# Patient Record
Sex: MALE | Race: WHITE
[De-identification: names, ages, dates, MRNs, and addresses within clinical notes are randomized per-mention and may not be internally consistent; named-entity substitution may affect disease eponyms.]

---

## 2017-01-17 ENCOUNTER — RX ONLY (RX ONLY)
Age: 47
End: 2017-01-17

## 2017-01-17 ENCOUNTER — DOCTOR'S OFFICE (OUTPATIENT)
Dept: URBAN - NONMETROPOLITAN AREA CLINIC 1 | Facility: CLINIC | Age: 47
Setting detail: OPHTHALMOLOGY
End: 2017-01-17
Payer: COMMERCIAL

## 2017-01-17 DIAGNOSIS — H52.4: ICD-10-CM

## 2017-01-17 DIAGNOSIS — Z01.00: ICD-10-CM

## 2017-01-17 DIAGNOSIS — H52.13: ICD-10-CM

## 2017-01-17 PROCEDURE — 92014 COMPRE OPH EXAM EST PT 1/>: CPT | Performed by: OPTOMETRIST

## 2017-01-17 PROCEDURE — 92310 CONTACT LENS FITTING OU: CPT | Performed by: OPTOMETRIST

## 2017-01-17 ASSESSMENT — REFRACTION_OUTSIDERX
OD_AXIS: 165
OS_CYLINDER: -0.25
OD_VA2: 20/25+2
OS_ADD: +1.75
OD_VA3: 20/
OD_ADD: +1.75
OD_SPHERE: -9.75
OS_VA1: 20/25
OU_VA: 20/
OS_AXIS: 090
OS_VA3: 20/
OD_CYLINDER: -0.25
OS_SPHERE: -10.50
OS_VA2: 20/25
OD_VA1: 20/25+2

## 2017-01-17 ASSESSMENT — SPHEQUIV_DERIVED: OD_SPHEQUIV: -10.625

## 2017-01-17 ASSESSMENT — REFRACTION_MANIFEST
OS_VA3: 20/
OU_VA: 20/
OU_VA: 20/
OD_VA1: 20/
OD_VA2: 20/
OD_VA2: 20/
OS_VA1: 20/
OS_VA3: 20/
OD_VA3: 20/
OD_VA1: 20/
OS_VA1: 20/
OD_VA3: 20/
OS_VA2: 20/
OS_VA2: 20/

## 2017-01-17 ASSESSMENT — REFRACTION_AUTOREFRACTION
OS_CYLINDER: SPH
OD_AXIS: 141
OS_SPHERE: -11.50
OD_SPHERE: -10.50
OD_CYLINDER: -0.25

## 2017-01-17 ASSESSMENT — REFRACTION_CURRENTRX
OS_OVR_VA: 20/
OS_OVR_VA: 20/
OD_OVR_VA: 20/
OD_OVR_VA: 20/
OS_OVR_VA: 20/
OD_OVR_VA: 20/

## 2017-01-17 ASSESSMENT — KERATOMETRY
OD_AXISANGLE_DEGREES: 097
OS_AXISANGLE_DEGREES: 112
OD_K2POWER_DIOPTERS: 44.75
OS_K2POWER_DIOPTERS: 45.00
OD_K1POWER_DIOPTERS: 44.00
OS_K1POWER_DIOPTERS: 44.75

## 2017-01-17 ASSESSMENT — VISUAL ACUITY
OS_BCVA: 20/25+2
OD_BCVA: 20/25+1

## 2017-01-17 ASSESSMENT — AXIALLENGTH_DERIVED: OD_AL: 28.1473

## 2017-01-17 ASSESSMENT — CONFRONTATIONAL VISUAL FIELD TEST (CVF)
OS_FINDINGS: FULL
OD_FINDINGS: FULL

## 2018-08-21 ENCOUNTER — DOCTOR'S OFFICE (OUTPATIENT)
Dept: URBAN - NONMETROPOLITAN AREA CLINIC 1 | Facility: CLINIC | Age: 48
Setting detail: OPHTHALMOLOGY
End: 2018-08-21
Payer: COMMERCIAL

## 2018-08-21 DIAGNOSIS — H52.13: ICD-10-CM

## 2018-08-21 DIAGNOSIS — H52.4: ICD-10-CM

## 2018-08-21 PROCEDURE — 92310 CONTACT LENS FITTING OU: CPT | Performed by: OPTOMETRIST

## 2018-08-21 PROCEDURE — 92014 COMPRE OPH EXAM EST PT 1/>: CPT | Performed by: OPTOMETRIST

## 2018-08-21 ASSESSMENT — REFRACTION_OUTSIDERX
OU_VA: 20/
OS_VA1: 20/25
OS_ADD: +1.75
OS_SPHERE: -10.50
OD_SPHERE: -9.75
OS_VA2: 20/25
OD_VA1: 20/25+2
OS_CYLINDER: -0.25
OS_AXIS: 090
OD_AXIS: 165
OD_ADD: +1.75
OD_VA3: 20/
OD_CYLINDER: -0.25
OS_VA3: 20/
OD_VA2: 20/25+2

## 2018-08-21 ASSESSMENT — REFRACTION_CURRENTRX
OS_ADD: +1.75
OS_SPHERE: -10.50
OD_VPRISM_DIRECTION: BF
OD_SPHERE: -9.75
OD_ADD: +1.75
OS_OVR_VA: 20/
OS_VPRISM_DIRECTION: BF
OS_CYLINDER: -0.25
OD_OVR_VA: 20/
OD_AXIS: 165
OS_OVR_VA: 20/
OS_AXIS: 090
OD_OVR_VA: 20/
OS_OVR_VA: 20/
OD_CYLINDER: -0.25
OD_OVR_VA: 20/

## 2018-08-21 ASSESSMENT — REFRACTION_MANIFEST
OD_VA1: 20/
OD_VA2: 20/
OU_VA: 20/
OD_VA1: 20/
OU_VA: 20/
OD_VA3: 20/
OS_VA2: 20/
OS_VA1: 20/
OS_VA2: 20/
OD_VA2: 20/
OS_VA3: 20/
OS_VA1: 20/
OD_VA3: 20/
OS_VA3: 20/

## 2018-08-21 ASSESSMENT — CONFRONTATIONAL VISUAL FIELD TEST (CVF)
OD_FINDINGS: FULL
OS_FINDINGS: FULL

## 2018-08-21 ASSESSMENT — VISUAL ACUITY
OS_BCVA: 20/20
OD_BCVA: 20/25-2

## 2018-08-21 ASSESSMENT — REFRACTION_AUTOREFRACTION
OS_CYLINDER: SPH
OD_AXIS: 141
OD_SPHERE: -10.50
OS_SPHERE: -11.50
OD_CYLINDER: -0.25

## 2018-08-21 ASSESSMENT — SPHEQUIV_DERIVED: OD_SPHEQUIV: -10.625

## 2019-11-26 ENCOUNTER — DOCTOR'S OFFICE (OUTPATIENT)
Dept: URBAN - NONMETROPOLITAN AREA CLINIC 1 | Facility: CLINIC | Age: 49
Setting detail: OPHTHALMOLOGY
End: 2019-11-26
Payer: COMMERCIAL

## 2019-11-26 DIAGNOSIS — H52.13: ICD-10-CM

## 2019-11-26 DIAGNOSIS — H52.4: ICD-10-CM

## 2019-11-26 PROCEDURE — 92014 COMPRE OPH EXAM EST PT 1/>: CPT | Performed by: OPTOMETRIST

## 2019-11-26 PROCEDURE — 92310 CONTACT LENS FITTING OU: CPT | Performed by: OPTOMETRIST

## 2019-11-26 ASSESSMENT — REFRACTION_MANIFEST
OS_VA1: 20/25
OD_VA1: 20/
OD_AXIS: 120
OD_VA2: 20/25+2
OD_VA3: 20/
OS_ADD: +2.00
OD_CYLINDER: -0.25
OD_VA3: 20/
OS_VA1: 20/
OS_CYLINDER: -0.25
OS_VA3: 20/
OS_VA2: 20/
OD_VA1: 20/25+2
OS_SPHERE: -10.50
OS_VA3: 20/
OU_VA: 20/
OD_ADD: +2.00
OD_SPHERE: -9.50
OS_AXIS: 090
OD_VA2: 20/
OS_VA2: 20/25
OU_VA: 20/

## 2019-11-26 ASSESSMENT — KERATOMETRY
OD_K2POWER_DIOPTERS: 44.75
OD_K1POWER_DIOPTERS: 44.00
OD_AXISANGLE_DEGREES: 097
OS_AXISANGLE_DEGREES: 112
OS_K2POWER_DIOPTERS: 45.00
OS_K1POWER_DIOPTERS: 44.75

## 2019-11-26 ASSESSMENT — REFRACTION_AUTOREFRACTION
OD_AXIS: 136
OS_CYLINDER: -0.50
OS_AXIS: 084
OD_CYLINDER: -1.00
OS_SPHERE: -11.50
OD_SPHERE: -9.50

## 2019-11-26 ASSESSMENT — VISUAL ACUITY
OD_BCVA: 20/20-1
OS_BCVA: 20/25+2

## 2019-11-26 ASSESSMENT — REFRACTION_CURRENTRX
OD_SPHERE: -9.75
OS_OVR_VA: 20/
OD_ADD: +1.75
OD_AXIS: 165
OS_OVR_VA: 20/
OS_CYLINDER: -0.25
OS_OVR_VA: 20/
OS_SPHERE: -10.50
OS_VPRISM_DIRECTION: BF
OS_ADD: +1.75
OD_VPRISM_DIRECTION: BF
OD_CYLINDER: -0.25
OS_AXIS: 090
OD_OVR_VA: 20/

## 2019-11-26 ASSESSMENT — SPHEQUIV_DERIVED
OD_SPHEQUIV: -10
OS_SPHEQUIV: -10.625
OS_SPHEQUIV: -11.75
OD_SPHEQUIV: -9.625

## 2019-11-26 ASSESSMENT — AXIALLENGTH_DERIVED
OD_AL: 27.6034
OS_AL: 27.8883
OS_AL: 28.5145
OD_AL: 27.8049

## 2019-11-26 ASSESSMENT — CONFRONTATIONAL VISUAL FIELD TEST (CVF)
OD_FINDINGS: FULL
OS_FINDINGS: FULL

## 2020-11-30 ENCOUNTER — DOCTOR'S OFFICE (OUTPATIENT)
Dept: URBAN - NONMETROPOLITAN AREA CLINIC 1 | Facility: CLINIC | Age: 50
Setting detail: OPHTHALMOLOGY
End: 2020-11-30
Payer: COMMERCIAL

## 2020-11-30 DIAGNOSIS — H52.13: ICD-10-CM

## 2020-11-30 DIAGNOSIS — H52.4: ICD-10-CM

## 2020-11-30 DIAGNOSIS — H25.013: ICD-10-CM

## 2020-11-30 PROBLEM — Z01.00 GOOD OCULAR HEALTH OU ; BOTH EYES: Status: RESOLVED | Noted: 2017-01-17 | Resolved: 2020-11-30

## 2020-11-30 PROCEDURE — 92014 COMPRE OPH EXAM EST PT 1/>: CPT | Performed by: OPTOMETRIST

## 2020-11-30 PROCEDURE — 92310 CONTACT LENS FITTING OU: CPT | Performed by: OPTOMETRIST

## 2020-11-30 ASSESSMENT — SPHEQUIV_DERIVED
OS_SPHEQUIV: -11.75
OD_SPHEQUIV: -10
OS_SPHEQUIV: -10.625
OD_SPHEQUIV: -9.625

## 2020-11-30 ASSESSMENT — REFRACTION_CURRENTRX
OS_ADD: +1.75
OS_CYLINDER: -0.25
OD_ADD: +1.75
OS_OVR_VA: 20/
OS_AXIS: 090
OS_VPRISM_DIRECTION: BF
OD_CYLINDER: -0.25
OS_SPHERE: -10.50
OD_SPHERE: -9.75
OD_AXIS: 165
OD_OVR_VA: 20/
OD_VPRISM_DIRECTION: BF

## 2020-11-30 ASSESSMENT — REFRACTION_MANIFEST
OD_AXIS: 120
OD_CYLINDER: -0.25
OS_VA1: 20/25
OS_CYLINDER: -0.25
OD_ADD: +2.00
OS_ADD: +2.00
OD_SPHERE: -9.50
OS_VA2: 20/25
OS_SPHERE: -10.50
OD_VA1: 20/25+2
OS_AXIS: 090
OD_VA2: 20/25+2

## 2020-11-30 ASSESSMENT — AXIALLENGTH_DERIVED
OS_AL: 27.8883
OD_AL: 27.8049
OS_AL: 28.5145
OD_AL: 27.6034

## 2020-11-30 ASSESSMENT — KERATOMETRY
OD_AXISANGLE_DEGREES: 097
OS_K2POWER_DIOPTERS: 45.00
OD_K1POWER_DIOPTERS: 44.00
OD_K2POWER_DIOPTERS: 44.75
OS_K1POWER_DIOPTERS: 44.75
OS_AXISANGLE_DEGREES: 112

## 2020-11-30 ASSESSMENT — REFRACTION_AUTOREFRACTION
OS_AXIS: 084
OD_CYLINDER: -1.00
OD_AXIS: 136
OS_CYLINDER: -0.50
OD_SPHERE: -9.50
OS_SPHERE: -11.50

## 2020-11-30 ASSESSMENT — TONOMETRY
OD_IOP_MMHG: 18
OS_IOP_MMHG: 18

## 2020-11-30 ASSESSMENT — VISUAL ACUITY
OS_BCVA: 20/20-1
OD_BCVA: 20/25+1

## 2020-11-30 ASSESSMENT — CONFRONTATIONAL VISUAL FIELD TEST (CVF)
OS_FINDINGS: FULL
OD_FINDINGS: FULL

## 2021-12-03 ENCOUNTER — DOCTOR'S OFFICE (OUTPATIENT)
Dept: URBAN - NONMETROPOLITAN AREA CLINIC 1 | Facility: CLINIC | Age: 51
Setting detail: OPHTHALMOLOGY
End: 2021-12-03
Payer: COMMERCIAL

## 2021-12-03 DIAGNOSIS — H52.13: ICD-10-CM

## 2021-12-03 DIAGNOSIS — H52.4: ICD-10-CM

## 2021-12-03 PROBLEM — H25.013 CORTICAL CATARACT; BOTH EYES: Status: ACTIVE | Noted: 2020-11-30

## 2021-12-03 PROCEDURE — 92310 CONTACT LENS FITTING OU: CPT | Performed by: OPTOMETRIST

## 2021-12-03 PROCEDURE — 92014 COMPRE OPH EXAM EST PT 1/>: CPT | Performed by: OPTOMETRIST

## 2021-12-03 ASSESSMENT — VISUAL ACUITY
OD_BCVA: 20/20
OS_BCVA: 20/20-1

## 2021-12-03 ASSESSMENT — REFRACTION_MANIFEST
OS_ADD: +2.25
OD_VA2: 20/25+2
OS_VA1: 20/25
OD_ADD: +2.25
OD_AXIS: 120
OS_AXIS: 090
OS_CYLINDER: -0.25
OD_CYLINDER: -0.25
OS_VA2: 20/25
OD_SPHERE: -9.50
OS_SPHERE: -10.50
OD_VA1: 20/25+2

## 2021-12-03 ASSESSMENT — SPHEQUIV_DERIVED
OD_SPHEQUIV: -9.625
OS_SPHEQUIV: -10.625
OD_SPHEQUIV: -10.25
OS_SPHEQUIV: -11.125

## 2021-12-03 ASSESSMENT — REFRACTION_CURRENTRX
OD_CYLINDER: -0.25
OS_OVR_VA: 20/
OD_AXIS: 120
OS_ADD: +1.75
OD_ADD: +1.75
OS_SPHERE: -10.50
OD_SPHERE: -9.50
OS_VPRISM_DIRECTION: BF
OD_OVR_VA: 20/
OS_AXIS: 090
OS_CYLINDER: -0.25
OD_VPRISM_DIRECTION: BF

## 2021-12-03 ASSESSMENT — REFRACTION_AUTOREFRACTION
OS_CYLINDER: -0.25
OD_AXIS: 116
OD_SPHERE: -10.00
OD_CYLINDER: -0.50
OS_SPHERE: -11.00
OS_AXIS: 157

## 2021-12-03 ASSESSMENT — KERATOMETRY
OD_K1POWER_DIOPTERS: 44.00
OS_AXISANGLE_DEGREES: 112
OD_AXISANGLE_DEGREES: 097
OS_K2POWER_DIOPTERS: 45.00
OS_K1POWER_DIOPTERS: 44.75
OD_K2POWER_DIOPTERS: 44.75

## 2021-12-03 ASSESSMENT — TONOMETRY
OD_IOP_MMHG: 18
OS_IOP_MMHG: 18

## 2021-12-03 ASSESSMENT — CONFRONTATIONAL VISUAL FIELD TEST (CVF)
OS_FINDINGS: FULL
OD_FINDINGS: FULL

## 2021-12-03 ASSESSMENT — AXIALLENGTH_DERIVED
OD_AL: 27.9409
OS_AL: 28.1632
OS_AL: 27.8883
OD_AL: 27.6034

## 2022-12-05 ENCOUNTER — DOCTOR'S OFFICE (OUTPATIENT)
Dept: URBAN - NONMETROPOLITAN AREA CLINIC 1 | Facility: CLINIC | Age: 52
Setting detail: OPHTHALMOLOGY
End: 2022-12-05
Payer: COMMERCIAL

## 2022-12-05 DIAGNOSIS — H52.4: ICD-10-CM

## 2022-12-05 DIAGNOSIS — H52.13: ICD-10-CM

## 2022-12-05 PROCEDURE — 92310 CONTACT LENS FITTING OU: CPT | Performed by: OPTOMETRIST

## 2022-12-05 PROCEDURE — 92014 COMPRE OPH EXAM EST PT 1/>: CPT | Performed by: OPTOMETRIST

## 2022-12-05 ASSESSMENT — CONFRONTATIONAL VISUAL FIELD TEST (CVF)
OS_FINDINGS: FULL
OD_FINDINGS: FULL

## 2022-12-05 ASSESSMENT — TONOMETRY
OD_IOP_MMHG: 18
OS_IOP_MMHG: 18

## 2022-12-07 ASSESSMENT — AXIALLENGTH_DERIVED
OD_AL: 27.6034
OS_AL: 27.8883
OD_AL: 27.9409
OS_AL: 28.1632

## 2022-12-07 ASSESSMENT — KERATOMETRY
OD_K2POWER_DIOPTERS: 44.75
OS_K1POWER_DIOPTERS: 44.75
OS_AXISANGLE_DEGREES: 112
OD_K1POWER_DIOPTERS: 44.00
OS_K2POWER_DIOPTERS: 45.00
OD_AXISANGLE_DEGREES: 097

## 2022-12-07 ASSESSMENT — REFRACTION_CURRENTRX
OD_OVR_VA: 20/
OS_AXIS: 090
OS_OVR_VA: 20/
OD_ADD: +1.75
OS_SPHERE: -10.50
OS_CYLINDER: -0.25
OD_VPRISM_DIRECTION: BF
OD_AXIS: 120
OS_ADD: +1.75
OD_CYLINDER: -0.25
OD_SPHERE: -9.50
OS_VPRISM_DIRECTION: BF

## 2022-12-07 ASSESSMENT — REFRACTION_MANIFEST
OS_SPHERE: -10.50
OS_AXIS: 090
OD_SPHERE: -9.50
OS_ADD: +2.25
OD_ADD: +2.25
OD_VA1: 20/25+2
OS_VA2: 20/25
OD_VA2: 20/25+2
OD_CYLINDER: -0.25
OS_CYLINDER: -0.25
OD_AXIS: 120
OS_VA1: 20/25

## 2022-12-07 ASSESSMENT — REFRACTION_AUTOREFRACTION
OS_CYLINDER: -0.25
OS_AXIS: 157
OS_SPHERE: -11.00
OD_SPHERE: -10.00
OD_CYLINDER: -0.50
OD_AXIS: 116

## 2022-12-07 ASSESSMENT — VISUAL ACUITY
OS_BCVA: 20/25+2
OD_BCVA: 20/20-1

## 2022-12-07 ASSESSMENT — SPHEQUIV_DERIVED
OD_SPHEQUIV: -9.625
OS_SPHEQUIV: -10.625
OD_SPHEQUIV: -10.25
OS_SPHEQUIV: -11.125

## 2023-12-20 ENCOUNTER — DOCTOR'S OFFICE (OUTPATIENT)
Dept: URBAN - NONMETROPOLITAN AREA CLINIC 1 | Facility: CLINIC | Age: 53
Setting detail: OPHTHALMOLOGY
End: 2023-12-20
Payer: COMMERCIAL

## 2023-12-20 DIAGNOSIS — H52.4: ICD-10-CM

## 2023-12-20 DIAGNOSIS — H52.13: ICD-10-CM

## 2023-12-20 PROCEDURE — 92310 CONTACT LENS FITTING OU: CPT | Performed by: OPTOMETRIST

## 2023-12-20 PROCEDURE — 92014 COMPRE OPH EXAM EST PT 1/>: CPT | Performed by: OPTOMETRIST

## 2023-12-20 ASSESSMENT — REFRACTION_MANIFEST
OD_ADD: +2.50
OD_VA2: 20/25+2
OD_SPHERE: -9.50
OS_SPHERE: -10.50
OS_AXIS: 090
OS_CYLINDER: -0.50
OD_CYLINDER: -0.25
OS_VA1: 20/25
OD_VA1: 20/25+2
OD_AXIS: 120
OS_ADD: +2.50
OS_VA2: 20/25

## 2023-12-20 ASSESSMENT — REFRACTION_CURRENTRX
OD_CYLINDER: -0.25
OS_CYLINDER: -0.25
OS_VPRISM_DIRECTION: BF
OD_ADD: +2.25
OD_AXIS: 120
OD_SPHERE: -9.50
OS_AXIS: 090
OS_OVR_VA: 20/
OS_SPHERE: -10.50
OD_VPRISM_DIRECTION: BF
OD_OVR_VA: 20/
OS_ADD: +2.25

## 2023-12-20 ASSESSMENT — SPHEQUIV_DERIVED
OS_SPHEQUIV: -11.75
OS_SPHEQUIV: -10.75
OD_SPHEQUIV: -10.25
OD_SPHEQUIV: -9.625

## 2023-12-20 ASSESSMENT — CONFRONTATIONAL VISUAL FIELD TEST (CVF)
OS_FINDINGS: FULL
OD_FINDINGS: FULL

## 2023-12-20 ASSESSMENT — REFRACTION_AUTOREFRACTION
OS_AXIS: 117
OD_SPHERE: -9.50
OD_CYLINDER: -1.50
OD_AXIS: 121
OS_CYLINDER: -1.50
OS_SPHERE: -11.00

## 2024-08-09 ENCOUNTER — OFFICE VISIT (OUTPATIENT)
Dept: URGENT CARE | Facility: CLINIC | Age: 54
End: 2024-08-09
Payer: COMMERCIAL

## 2024-08-09 VITALS
RESPIRATION RATE: 16 BRPM | TEMPERATURE: 97 F | HEART RATE: 78 BPM | OXYGEN SATURATION: 98 % | SYSTOLIC BLOOD PRESSURE: 122 MMHG | HEIGHT: 73 IN | BODY MASS INDEX: 28.49 KG/M2 | WEIGHT: 215 LBS | DIASTOLIC BLOOD PRESSURE: 70 MMHG

## 2024-08-09 DIAGNOSIS — H66.92 LEFT OTITIS MEDIA, UNSPECIFIED OTITIS MEDIA TYPE: Primary | ICD-10-CM

## 2024-08-09 PROCEDURE — S9083 URGENT CARE CENTER GLOBAL: HCPCS

## 2024-08-09 PROCEDURE — G0382 LEV 3 HOSP TYPE B ED VISIT: HCPCS

## 2024-08-09 RX ORDER — AMOXICILLIN 500 MG/1
500 CAPSULE ORAL EVERY 12 HOURS SCHEDULED
Qty: 14 CAPSULE | Refills: 0 | Status: SHIPPED | OUTPATIENT
Start: 2024-08-09 | End: 2024-08-16

## 2024-08-09 RX ORDER — LISINOPRIL 30 MG/1
30 TABLET ORAL DAILY
COMMUNITY

## 2024-08-09 NOTE — PATIENT INSTRUCTIONS
Take antibiotics as prescribed  Tylenol or ibuprofen as needed for pain  Follow up with PCP in 3-5 days.  Proceed to  ER if symptoms worsen.    If tests are performed, our office will contact you with results only if changes need to made to the care plan discussed with you at the visit. You can review your full results on [facility].

## 2024-08-09 NOTE — PROGRESS NOTES
[facility]        NAME: Hernandez Purcell is a 54 y.o. male  : 1970    MRN: [id number]  DATE: 2024  TIME: 1:12 PM    Assessment and Plan   Left otitis media, unspecified otitis media type [H66.92]  1. Left otitis media, unspecified otitis media type  amoxicillin (AMOXIL) 500 mg capsule            Patient Instructions     Take antibiotics as prescribed  Tylenol or ibuprofen as needed for pain  Follow up with PCP in 3-5 days.  Proceed to  ER if symptoms worsen.    If tests are performed, our office will contact you with results only if changes need to made to the care plan discussed with you at the visit. [facility] on [facility]    Chief Complaint     Chief Complaint   Patient presents with    Earache     Left earache and now blocked over the past week. Swimming a lot.         History of Present Illness       Earache   There is pain in the left ear. This is a new problem. Episode onset: 1 week. There has been no fever. Pertinent negatives include no coughing, ear discharge, headaches, hearing loss, rhinorrhea or sore throat.   He stated he has been swimming.    Review of Systems   Review of Systems   Constitutional:  Negative for chills, fatigue and fever.   HENT:  Positive for ear pain (L ear). Negative for congestion, ear discharge, hearing loss, postnasal drip, rhinorrhea, sinus pressure, sneezing, sore throat and tinnitus.    Eyes:  Negative for photophobia, redness and itching.   Respiratory:  Negative for cough, chest tightness, shortness of breath and wheezing.    Cardiovascular:  Negative for chest pain.   Skin:  Negative for color change and pallor.   Neurological:  Negative for dizziness, light-headedness and headaches.   Psychiatric/Behavioral:  Negative for confusion.          Current Medications       Current Outpatient Medications:     amoxicillin (AMOXIL) 500 mg capsule, Take 1 capsule (500 mg total) by mouth every 12 (twelve) hours for 7 days,  "Disp: 14 capsule, Rfl: 0    lisinopril (ZESTRIL) 30 mg tablet, Take 30 mg by mouth daily, Disp: , Rfl:     Current Allergies     Allergies as of 08/09/2024    (No Known Allergies)            The following portions of the patient's history were reviewed and updated as appropriate: allergies, current medications, past family history, past medical history, past social history, past surgical history and problem list.     Past Medical History:   Diagnosis Date    Hypertension        Past Surgical History:   Procedure Laterality Date    ANTERIOR CRUCIATE LIGAMENT REPAIR Bilateral     APPENDECTOMY      ERCP W/ PLASTIC STENT PLACEMENT      face    JOINT REPLACEMENT      right knee       Family History   Problem Relation Age of Onset    Cancer Mother     Heart disease Father          Medications have been verified.        Objective   /70   Pulse 78   Temp (!) 97 °F (36.1 °C)   Resp 16   Ht 6' 1\" (1.854 m)   Wt 97.5 kg (215 lb)   SpO2 98%   BMI 28.37 kg/m²        Physical Exam     Physical Exam  Constitutional:       Appearance: Normal appearance.   HENT:      Head: Normocephalic.      Right Ear: Tympanic membrane and external ear normal.      Left Ear: External ear normal. Swelling present. Tympanic membrane is erythematous and bulging.      Mouth/Throat:      Mouth: Mucous membranes are moist.      Pharynx: Oropharynx is clear.   Eyes:      Extraocular Movements: Extraocular movements intact.      Conjunctiva/sclera: Conjunctivae normal.      Pupils: Pupils are equal, round, and reactive to light.   Cardiovascular:      Rate and Rhythm: Normal rate and regular rhythm.      Pulses: Normal pulses.      Heart sounds: Normal heart sounds.   Pulmonary:      Effort: Pulmonary effort is normal. No respiratory distress.      Breath sounds: Normal breath sounds. No stridor. No wheezing, rhonchi or rales.   Musculoskeletal:      Cervical back: No tenderness.   Lymphadenopathy:      Cervical: No cervical adenopathy. "   Skin:     General: Skin is warm and dry.   Neurological:      General: No focal deficit present.      Mental Status: He is alert and oriented to person, place, and time. Mental status is at baseline.   Psychiatric:         Mood and Affect: Mood normal.         Behavior: Behavior normal.         Thought Content: Thought content normal.         Judgment: Judgment normal.

## 2024-12-23 ENCOUNTER — DOCTOR'S OFFICE (OUTPATIENT)
Dept: URBAN - NONMETROPOLITAN AREA CLINIC 1 | Facility: CLINIC | Age: 54
Setting detail: OPHTHALMOLOGY
End: 2024-12-23
Payer: COMMERCIAL

## 2024-12-23 DIAGNOSIS — H52.4: ICD-10-CM

## 2024-12-23 DIAGNOSIS — H52.13: ICD-10-CM

## 2024-12-23 PROCEDURE — 92014 COMPRE OPH EXAM EST PT 1/>: CPT | Performed by: OPTOMETRIST

## 2024-12-23 ASSESSMENT — REFRACTION_AUTOREFRACTION
OD_CYLINDER: -0.75
OS_SPHERE: -10.75
OS_CYLINDER: -0.25
OD_AXIS: 143
OD_SPHERE: -9.00
OS_AXIS: 075

## 2024-12-23 ASSESSMENT — KERATOMETRY
OS_K1POWER_DIOPTERS: 44.75
OS_AXISANGLE_DEGREES: 112
OD_K2POWER_DIOPTERS: 44.75
OD_K1POWER_DIOPTERS: 44.00
OS_K2POWER_DIOPTERS: 45.00
OD_AXISANGLE_DEGREES: 097

## 2024-12-23 ASSESSMENT — REFRACTION_CURRENTRX
OD_CYLINDER: -0.25
OS_VPRISM_DIRECTION: BF
OD_VPRISM_DIRECTION: BF
OD_OVR_VA: 20/
OD_AXIS: 014
OS_ADD: +2.50
OS_AXIS: 130
OS_SPHERE: -10.75
OD_SPHERE: -9.75
OS_CYLINDER: -0.50
OD_ADD: +2.50
OS_OVR_VA: 20/

## 2024-12-23 ASSESSMENT — REFRACTION_MANIFEST
OD_VA1: 20/25+2
OD_ADD: +2.50
OS_ADD: +2.50
OD_AXIS: 120
OS_AXIS: 090
OS_CYLINDER: -0.50
OD_SPHERE: -9.50
OS_VA1: 20/25
OD_VA2: 20/25+2
OS_VA2: 20/25
OS_SPHERE: -10.50
OD_CYLINDER: -0.25

## 2024-12-23 ASSESSMENT — CONFRONTATIONAL VISUAL FIELD TEST (CVF)
OD_FINDINGS: FULL
OS_FINDINGS: FULL

## 2024-12-23 ASSESSMENT — VISUAL ACUITY
OS_BCVA: 20/20
OD_BCVA: 20/20

## 2024-12-23 ASSESSMENT — TONOMETRY
OD_IOP_MMHG: 16
OS_IOP_MMHG: 16

## 2025-03-05 ENCOUNTER — OPTICAL OFFICE (OUTPATIENT)
Dept: URBAN - NONMETROPOLITAN AREA CLINIC 4 | Facility: CLINIC | Age: 55
Setting detail: OPHTHALMOLOGY
End: 2025-03-05
Payer: COMMERCIAL

## 2025-03-05 DIAGNOSIS — H52.13: ICD-10-CM

## 2025-03-05 PROCEDURE — V2750 ANTI-REFLECTIVE COATING: HCPCS | Mod: PR | Performed by: OPTOMETRIST

## 2025-03-05 PROCEDURE — V2783 LENS, >= 1.66 P/>=1.80 G: HCPCS | Mod: LT | Performed by: OPTOMETRIST

## 2025-03-05 PROCEDURE — V2783 LENS, >= 1.66 P/>=1.80 G: HCPCS | Performed by: OPTOMETRIST

## 2025-03-05 PROCEDURE — V2750 ANTI-REFLECTIVE COATING: HCPCS | Mod: LT | Performed by: OPTOMETRIST

## 2025-03-05 PROCEDURE — V2211 LENS SPHCY BIFO 7.25-12/.25-: HCPCS | Performed by: OPTOMETRIST

## 2025-03-05 PROCEDURE — V2025 EYEGLASSES DELUX FRAMES: HCPCS | Performed by: OPTOMETRIST

## 2025-03-05 PROCEDURE — V2211 LENS SPHCY BIFO 7.25-12/.25-: HCPCS | Mod: LT | Performed by: OPTOMETRIST

## 2025-03-05 PROCEDURE — V2020 VISION SVCS FRAMES PURCHASES: HCPCS | Performed by: OPTOMETRIST

## 2025-07-28 ENCOUNTER — OFFICE VISIT (OUTPATIENT)
Dept: URGENT CARE | Facility: CLINIC | Age: 55
End: 2025-07-28
Payer: COMMERCIAL

## 2025-07-28 VITALS
BODY MASS INDEX: 29.39 KG/M2 | SYSTOLIC BLOOD PRESSURE: 128 MMHG | OXYGEN SATURATION: 98 % | HEART RATE: 80 BPM | TEMPERATURE: 96.4 F | WEIGHT: 217 LBS | DIASTOLIC BLOOD PRESSURE: 76 MMHG | HEIGHT: 72 IN | RESPIRATION RATE: 16 BRPM

## 2025-07-28 DIAGNOSIS — H60.331 ACUTE SWIMMER'S EAR OF RIGHT SIDE: Primary | ICD-10-CM

## 2025-07-28 PROCEDURE — S9083 URGENT CARE CENTER GLOBAL: HCPCS

## 2025-07-28 PROCEDURE — G0382 LEV 3 HOSP TYPE B ED VISIT: HCPCS

## 2025-07-28 RX ORDER — OFLOXACIN 3 MG/ML
10 SOLUTION AURICULAR (OTIC) DAILY
Qty: 10 ML | Refills: 0 | Status: SHIPPED | OUTPATIENT
Start: 2025-07-28

## 2025-07-28 RX ORDER — ATORVASTATIN CALCIUM 20 MG/1
TABLET, FILM COATED ORAL
COMMUNITY
Start: 2025-03-26